# Patient Record
Sex: MALE | Race: OTHER | HISPANIC OR LATINO | URBAN - METROPOLITAN AREA
[De-identification: names, ages, dates, MRNs, and addresses within clinical notes are randomized per-mention and may not be internally consistent; named-entity substitution may affect disease eponyms.]

---

## 2023-05-06 ENCOUNTER — EMERGENCY (EMERGENCY)
Facility: HOSPITAL | Age: 43
LOS: 1 days | Discharge: HOME | End: 2023-05-06
Attending: EMERGENCY MEDICINE
Payer: SELF-PAY

## 2023-05-06 VITALS
OXYGEN SATURATION: 99 % | SYSTOLIC BLOOD PRESSURE: 118 MMHG | RESPIRATION RATE: 18 BRPM | HEART RATE: 70 BPM | TEMPERATURE: 98 F | DIASTOLIC BLOOD PRESSURE: 66 MMHG

## 2023-05-06 VITALS
TEMPERATURE: 99 F | HEART RATE: 86 BPM | RESPIRATION RATE: 16 BRPM | SYSTOLIC BLOOD PRESSURE: 130 MMHG | DIASTOLIC BLOOD PRESSURE: 70 MMHG | WEIGHT: 172.18 LBS | OXYGEN SATURATION: 99 %

## 2023-05-06 DIAGNOSIS — M25.512 PAIN IN LEFT SHOULDER: ICD-10-CM

## 2023-05-06 DIAGNOSIS — M25.562 PAIN IN LEFT KNEE: ICD-10-CM

## 2023-05-06 DIAGNOSIS — S99.911A UNSPECIFIED INJURY OF RIGHT ANKLE, INITIAL ENCOUNTER: ICD-10-CM

## 2023-05-06 DIAGNOSIS — S79.912A UNSPECIFIED INJURY OF LEFT HIP, INITIAL ENCOUNTER: ICD-10-CM

## 2023-05-06 DIAGNOSIS — M25.572 PAIN IN LEFT ANKLE AND JOINTS OF LEFT FOOT: ICD-10-CM

## 2023-05-06 DIAGNOSIS — M25.571 PAIN IN RIGHT ANKLE AND JOINTS OF RIGHT FOOT: ICD-10-CM

## 2023-05-06 DIAGNOSIS — M54.9 DORSALGIA, UNSPECIFIED: ICD-10-CM

## 2023-05-06 DIAGNOSIS — M25.552 PAIN IN LEFT HIP: ICD-10-CM

## 2023-05-06 DIAGNOSIS — M25.561 PAIN IN RIGHT KNEE: ICD-10-CM

## 2023-05-06 DIAGNOSIS — S79.911A UNSPECIFIED INJURY OF RIGHT HIP, INITIAL ENCOUNTER: ICD-10-CM

## 2023-05-06 DIAGNOSIS — M25.511 PAIN IN RIGHT SHOULDER: ICD-10-CM
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
## 2023-05-06 DIAGNOSIS — M25.551 PAIN IN RIGHT HIP: ICD-10-CM

## 2023-05-06 PROCEDURE — 73630 X-RAY EXAM OF FOOT: CPT | Mod: 26,50

## 2023-05-06 PROCEDURE — 72131 CT LUMBAR SPINE W/O DYE: CPT | Mod: 26,MA

## 2023-05-06 PROCEDURE — 73650 X-RAY EXAM OF HEEL: CPT | Mod: 26,50

## 2023-05-06 PROCEDURE — 72220 X-RAY EXAM SACRUM TAILBONE: CPT | Mod: 26

## 2023-05-06 PROCEDURE — 72125 CT NECK SPINE W/O DYE: CPT | Mod: 26,MA

## 2023-05-06 PROCEDURE — 73590 X-RAY EXAM OF LOWER LEG: CPT | Mod: 50

## 2023-05-06 PROCEDURE — 72100 X-RAY EXAM L-S SPINE 2/3 VWS: CPT

## 2023-05-06 PROCEDURE — 29515 APPLICATION SHORT LEG SPLINT: CPT | Mod: RT

## 2023-05-06 PROCEDURE — 72128 CT CHEST SPINE W/O DYE: CPT | Mod: MA

## 2023-05-06 PROCEDURE — 72074 X-RAY EXAM THORAC SPINE4/>VW: CPT | Mod: 26

## 2023-05-06 PROCEDURE — 73610 X-RAY EXAM OF ANKLE: CPT | Mod: 26,50

## 2023-05-06 PROCEDURE — 72170 X-RAY EXAM OF PELVIS: CPT

## 2023-05-06 PROCEDURE — 72074 X-RAY EXAM THORAC SPINE4/>VW: CPT

## 2023-05-06 PROCEDURE — 73610 X-RAY EXAM OF ANKLE: CPT | Mod: 50

## 2023-05-06 PROCEDURE — 71046 X-RAY EXAM CHEST 2 VIEWS: CPT

## 2023-05-06 PROCEDURE — 73590 X-RAY EXAM OF LOWER LEG: CPT | Mod: 26,50

## 2023-05-06 PROCEDURE — 72128 CT CHEST SPINE W/O DYE: CPT | Mod: 26,MA

## 2023-05-06 PROCEDURE — 72170 X-RAY EXAM OF PELVIS: CPT | Mod: 26

## 2023-05-06 PROCEDURE — 72100 X-RAY EXAM L-S SPINE 2/3 VWS: CPT | Mod: 26

## 2023-05-06 PROCEDURE — 72220 X-RAY EXAM SACRUM TAILBONE: CPT

## 2023-05-06 PROCEDURE — 72131 CT LUMBAR SPINE W/O DYE: CPT | Mod: MA

## 2023-05-06 PROCEDURE — 29515 APPLICATION SHORT LEG SPLINT: CPT

## 2023-05-06 PROCEDURE — 96372 THER/PROPH/DIAG INJ SC/IM: CPT | Mod: XU

## 2023-05-06 PROCEDURE — 99285 EMERGENCY DEPT VISIT HI MDM: CPT | Mod: 25

## 2023-05-06 PROCEDURE — 71046 X-RAY EXAM CHEST 2 VIEWS: CPT | Mod: 26

## 2023-05-06 PROCEDURE — 72125 CT NECK SPINE W/O DYE: CPT | Mod: MA

## 2023-05-06 PROCEDURE — 73650 X-RAY EXAM OF HEEL: CPT | Mod: 50

## 2023-05-06 PROCEDURE — 73630 X-RAY EXAM OF FOOT: CPT | Mod: 50

## 2023-05-06 PROCEDURE — 99284 EMERGENCY DEPT VISIT MOD MDM: CPT | Mod: 25

## 2023-05-06 RX ORDER — METHOCARBAMOL 500 MG/1
1500 TABLET, FILM COATED ORAL ONCE
Refills: 0 | Status: COMPLETED | OUTPATIENT
Start: 2023-05-06 | End: 2023-05-06

## 2023-05-06 RX ORDER — LIDOCAINE 4 G/100G
1 CREAM TOPICAL ONCE
Refills: 0 | Status: COMPLETED | OUTPATIENT
Start: 2023-05-06 | End: 2023-05-06

## 2023-05-06 RX ORDER — IBUPROFEN 200 MG
1 TABLET ORAL
Qty: 9 | Refills: 0
Start: 2023-05-06 | End: 2023-05-08

## 2023-05-06 RX ORDER — KETOROLAC TROMETHAMINE 30 MG/ML
30 SYRINGE (ML) INJECTION ONCE
Refills: 0 | Status: DISCONTINUED | OUTPATIENT
Start: 2023-05-06 | End: 2023-05-06

## 2023-05-06 RX ORDER — METHOCARBAMOL 500 MG/1
1 TABLET, FILM COATED ORAL
Qty: 9 | Refills: 0
Start: 2023-05-06 | End: 2023-05-08

## 2023-05-06 RX ADMIN — Medication 30 MILLIGRAM(S): at 18:51

## 2023-05-06 RX ADMIN — LIDOCAINE 1 PATCH: 4 CREAM TOPICAL at 18:48

## 2023-05-06 RX ADMIN — Medication 30 MILLIGRAM(S): at 19:09

## 2023-05-06 RX ADMIN — METHOCARBAMOL 1500 MILLIGRAM(S): 500 TABLET, FILM COATED ORAL at 18:52

## 2023-05-06 NOTE — ED PROVIDER NOTE - ATTENDING APP SHARED VISIT CONTRIBUTION OF CARE
41 yo m with no pmh presents with c/o fall, R ankle pain and midback pain.  pt says was about 8 ft up against the side of the house on a ladder when ladder slipped and he fell.  pt says he landed mostly on the R foot and rolled his R ankle and collided against the house with his shoulder.  pt denies head trauma.  no neck pain.  pt has been having b/l shoulder pain, b/l hip pain, b/l knee pain and b/l ankle pain since fall.  says pain is getting worse despite advil 400mg so came for eval.  no open wounds.  pt is ambulatory but with pain.  exam: nad, ncat, perrl, eomi, mmm, rrr, ctab, abd soft, nt, nd aox3, non tender b/l shoulder/hip/knees, no swelling, FROM, R lat mal with mild swelling and ttp, no crepitus, mildly ttp lower thoracic midline spine ,no stepoff, nontender c/l spine mildline imp: pt with mid back pain and R ankle pain and swelling, xrays and pain control

## 2023-05-06 NOTE — ED PROVIDER NOTE - PATIENT PORTAL LINK FT
You can access the FollowMyHealth Patient Portal offered by [facility] by registering at the following website: [email or web address]. By joining [facility]’s FollowMyHealth portal, you will also be able to view your health information using other applications (apps) compatible with our system.

## 2023-05-06 NOTE — ED PROVIDER NOTE - PHYSICAL EXAMINATION
CONST: Well appearing in NAD  Head: NCAT  EYES: PERRL, EOMI, Sclera and conjunctiva clear.   ENT: Oropharynx normal appearing, no erythema or exudates. Uvula midline.  NECK: Non-tender, no meningeal signs  CARD: Normal S1 S2; Normal rate and rhythm  RESP: Equal BS B/L, No wheezes, rhonchi or rales. No distress  GI: Soft, non-tender, non-distended.  MS: R ankle: TTP over b/l malleoli and calcaneous . Mid-thoracic midline tenderness. NV intact.   SKIN: Warm, dry, no acute rashes. Good turgor  NEURO: A&Ox3, No focal deficits.

## 2023-05-06 NOTE — ED PROVIDER NOTE - NSFOLLOWUPCLINICS_GEN_ALL_ED_FT
[facility]  Orthpedic  [address], [place]   Phone: [phone]  Fax:

## 2023-05-06 NOTE — ED PROVIDER NOTE - CLINICAL SUMMARY MEDICAL DECISION MAKING FREE TEXT BOX
Pt evaluated after sign out. Work up reviewed. No fractures noted. Ankle splinted. Will d/c with ortho follow up.

## 2023-05-06 NOTE — ED ADULT NURSE NOTE - OBJECTIVE STATEMENT
Pt. came to ED c/o fall with 9 foot ladder down the house. C/o pain to right foot and lower back. Denies HT, LOC, or AC.

## 2023-05-06 NOTE — ED PROVIDER NOTE - OBJECTIVE STATEMENT
41 yo male presents to the ED complaining of fall. Patient w/ fall 6 days ago from height of aprox 8 feet while on ladder at home. Patient slipped of ladder, landed on feet and knees, sustained injury to R ankle. Pain worse w/ ambulation. 43 yo male presents to the ED complaining of fall. Patient w/ fall 6 days ago from height of aprox 8 feet while on ladder at home. Patient slipped of ladder, landed on feet and knees, sustained injury to R ankle, hips and back. Pain worse w/ ambulation. Pain minimally improved w/ OTC analgesia.  Denies LOC, HS, AC use, dysuria, N/V vision changes. 43 yo male presents to the ED complaining of fall. Patient w/ fall 6 days ago from height of aprox 8 feet while on ladder at home. Patient states that ladder slipped and he fell on the ladder, landed on feet and knees, sustained injury to R ankle, hips and back. Pain worse w/ ambulation. Pain minimally improved w/ OTC analgesia. Denies LOC, HS, AC use, dysuria, N/V vision changes, dizziness, lightheadedness.

## 2023-05-06 NOTE — ED PROVIDER NOTE - NSFOLLOWUPINSTRUCTIONS_ED_ALL_ED_FT
Patient/Caregiver provided printed discharge information.
Our Emergency Department Referral Coordinators will be reaching out to you in the next 24-48 hours from 9:00am to 5:00pm with a follow up appointment. Please expect a phone call from the hospital in that time frame. If you do not receive a call or if you have any questions or concerns, you can reach them at   [phone]    Ankle Pain    The ankle joint holds your body weight and allows you to move around. Ankle pain can occur on either side or the back of one ankle or both ankles. Ankle pain may be sharp and burning or dull and aching. There may be tenderness, stiffness, redness, or warmth around the ankle. Many things can cause ankle pain, including an injury to the area and overuse of the ankle.  Follow these instructions at home:    Activity     Rest your ankle as told by your health care provider. Avoid any activities that cause ankle pain.Do not use the injured limb to support your body weight until your health care provider says that you can. Use crutches as told by your health care provider.Do exercises as told by your health care provider.Ask your health care provider when it is safe to drive if you have a brace on your ankle.If you have a brace:     Wear the brace as told by your health care provider. Remove it only as told by your health care provider.Loosen the brace if your toes tingle, become numb, or turn cold and blue.Keep the brace clean.If the brace is not waterproof:  Do not let it get wet.Cover it with a watertight covering when you take a bath or shower.If you were given an elastic bandage:        Remove it when you take a bath or a shower.Try not to move your ankle very much, but wiggle your toes from time to time. This helps to prevent swelling.Adjust the bandage to make it more comfortable if it feels too tight.Loosen the bandage if you have numbness or tingling in your foot or if your foot turns cold and blue.Managing pain, stiffness, and swelling       If directed, put ice on the painful area.  If you have a removable brace or elastic bandage, remove it as told by your health care provider.Put ice in a plastic bag.Place a towel between your skin and the bag.Leave the ice on for 20 minutes, 2–3 times a day.Move your toes often to avoid stiffness and to lessen swelling.Raise (elevate) your ankle above the level of your heart while you are sitting or lying down.General instructions     Record information about your pain. Writing down the following may be helpful for you and your health care provider:  How often you have ankle pain.Where the pain is located.What the pain feels like.If treatment involves wearing a prescribed shoe or insole, make sure you wear it correctly and for as long as told by your health care provider.Take over-the-counter and prescription medicines only as told by your health care provider.Keep all follow-up visits as told by your health care provider. This is important.Contact a health care provider if:  Your pain gets worse.Your pain is not relieved with medicines.You have a fever or chills.You are having more trouble with walking.You have new symptoms.Get help right away if:  Your foot, leg, toes, or ankle:  Tingles or becomes numb.Becomes swollen.Turns pale or blue.Summary  Ankle pain can occur on either side or the back of one ankle or both ankles.Ankle pain may be sharp and burning or dull and aching.Rest your ankle as told by your health care provider. If told, apply ice to the area.Take over-the-counter and prescription medicines only as told by your health care provider.This information is not intended to replace advice given to you by your health care provider. Make sure you discuss any questions you have with your health care provider.    Document Released: 06/07/2011 Document Revised: 06/26/2019 Document Reviewed: 06/26/2019  [employer] Interactive Patient Education © 2019 [employer]
